# Patient Record
Sex: MALE | Race: WHITE | Employment: FULL TIME | URBAN - METROPOLITAN AREA
[De-identification: names, ages, dates, MRNs, and addresses within clinical notes are randomized per-mention and may not be internally consistent; named-entity substitution may affect disease eponyms.]

---

## 2017-11-16 PROBLEM — M54.42 CHRONIC LEFT-SIDED LOW BACK PAIN WITH LEFT-SIDED SCIATICA: Status: ACTIVE | Noted: 2017-11-16

## 2017-11-16 PROBLEM — G89.29 CHRONIC LEFT-SIDED LOW BACK PAIN WITH LEFT-SIDED SCIATICA: Status: ACTIVE | Noted: 2017-11-16

## 2020-06-17 ENCOUNTER — HOSPITAL ENCOUNTER (EMERGENCY)
Facility: HOSPITAL | Age: 25
Discharge: HOME | End: 2020-06-17
Attending: EMERGENCY MEDICINE

## 2020-06-17 ENCOUNTER — APPOINTMENT (OUTPATIENT)
Dept: GENERAL RADIOLOGY | Facility: HOSPITAL | Age: 25
End: 2020-06-17
Attending: EMERGENCY MEDICINE

## 2020-06-17 VITALS
TEMPERATURE: 99 F | WEIGHT: 195.31 LBS | RESPIRATION RATE: 20 BRPM | OXYGEN SATURATION: 97 % | HEART RATE: 62 BPM | DIASTOLIC BLOOD PRESSURE: 79 MMHG | BODY MASS INDEX: 25 KG/M2 | SYSTOLIC BLOOD PRESSURE: 123 MMHG

## 2020-06-17 DIAGNOSIS — R07.89 CHEST PAIN, ATYPICAL: Primary | ICD-10-CM

## 2020-06-17 DIAGNOSIS — M94.0 COSTOCHONDRITIS, ACUTE: ICD-10-CM

## 2020-06-17 PROCEDURE — 84484 ASSAY OF TROPONIN QUANT: CPT | Performed by: EMERGENCY MEDICINE

## 2020-06-17 PROCEDURE — 85025 COMPLETE CBC W/AUTO DIFF WBC: CPT | Performed by: EMERGENCY MEDICINE

## 2020-06-17 PROCEDURE — 93005 ELECTROCARDIOGRAM TRACING: CPT

## 2020-06-17 PROCEDURE — 99284 EMERGENCY DEPT VISIT MOD MDM: CPT

## 2020-06-17 PROCEDURE — 80048 BASIC METABOLIC PNL TOTAL CA: CPT | Performed by: EMERGENCY MEDICINE

## 2020-06-17 PROCEDURE — 71045 X-RAY EXAM CHEST 1 VIEW: CPT | Performed by: EMERGENCY MEDICINE

## 2020-06-17 PROCEDURE — 36415 COLL VENOUS BLD VENIPUNCTURE: CPT

## 2020-06-17 PROCEDURE — 93010 ELECTROCARDIOGRAM REPORT: CPT | Performed by: EMERGENCY MEDICINE

## 2020-06-17 NOTE — ED NOTES
Pt is on direct observation with this ER Tech. Pt is sitting on his cart. He is cooperative and calm.

## 2020-06-17 NOTE — ED INITIAL ASSESSMENT (HPI)
Patient here with c/o mid sternal chest pain that started last night. States it is worse with movement and activity. States he has had similar pain in the past but it was found to be \"inconclusive. \" Denies cough/fevers--states he has occasional smokers c

## 2020-06-17 NOTE — ED PROVIDER NOTES
Patient Seen in: [facility] Emergency Department    History   Patient presents with:  Chest Pain Angina  Eval-P    Stated Complaint: midsternal chest pain last night    HPI    Patient is here with complaint of pain to the left anterior portion of breath  Gastrointestinal: no abdominal pain, no nausea, no vomiting        Positive for stated complaint: midsternal chest pain last night  Other systems are as noted in HPI. Constitutional and vital signs reviewed.       All other systems reviewed and neg has no plan to hurt himself and does not plan to hurt himself. He was given resources.     I discussed the patient's work-up and my feeling of costochondritis I discussed mechanics of movement NSAIDs as needed expected course of healing reasons to return a

## 2020-06-18 NOTE — BH LEVEL OF CARE ASSESSMENT
Level of Care Assessment Note    General Questions  Why are you here?: \"I have pain in my back and my hips. \"  Precipitating Events: Pt lost his brother 2 weeks ago. Hx of chronic pain. COVID stressors.   History of Present Illness: Pt reports hx of depres someone harmed or killed further back than 30 days?: No  Current or Past Harm Toward Animals: No  History or Allegations of Inappropriate Physical Contact: No  Have you ever damaged/destroyed property or thought about it?: No    Access to Means  Has access Current/Previous MH/CD Providers  Hospitalizations, Placements, Therapy, Detox:  Yes                          Current/Previous MH/CD Treatment  Recovery Support Groups: Denies Past History Anger  Appropriateness of Affect: Congruent to mood; Inappropriate to situation  Range of Affect: Normal  Stability of Affect: Stable  Attitude toward staff: Co-operative; Angry  Speech  Rate of Speech: Appropriate  Flow of Speech: Appropriate  Intensity of Unit Assigned: age/sxs/location  Partial Criteria: Potential for non-compliance  Refused Treatment: Yes  Can an [facility] Staff Call You in 24-72 Hours?: No  Refused Treatment Reason: Financial [place]  Education Provided: Call 911 in an Emergency;[facility] Crisis Line

## 2021-01-07 ENCOUNTER — APPOINTMENT (OUTPATIENT)
Dept: GENERAL RADIOLOGY | Facility: HOSPITAL | Age: 26
End: 2021-01-07
Attending: EMERGENCY MEDICINE

## 2021-01-07 ENCOUNTER — HOSPITAL ENCOUNTER (EMERGENCY)
Facility: HOSPITAL | Age: 26
Discharge: HOME | End: 2021-01-07
Attending: EMERGENCY MEDICINE

## 2021-01-07 VITALS
BODY MASS INDEX: 28 KG/M2 | TEMPERATURE: 98 F | HEART RATE: 65 BPM | WEIGHT: 220 LBS | SYSTOLIC BLOOD PRESSURE: 114 MMHG | RESPIRATION RATE: 14 BRPM | DIASTOLIC BLOOD PRESSURE: 73 MMHG | OXYGEN SATURATION: 96 %

## 2021-01-07 DIAGNOSIS — R07.89 ANTERIOR CHEST WALL PAIN: Primary | ICD-10-CM

## 2021-01-07 LAB
ANION GAP SERPL CALC-SCNC: 3 MMOL/L (ref 0–18)
BASOPHILS # BLD AUTO: 0.1 X10(3) UL (ref 0–0.2)
BASOPHILS NFR BLD AUTO: 1.1 %
BUN BLD-MCNC: 14 MG/DL (ref 7–18)
BUN/CREAT SERPL: 18.9 (ref 10–20)
CALCIUM BLD-MCNC: 9 MG/DL (ref 8.5–10.1)
CHLORIDE SERPL-SCNC: 107 MMOL/L (ref 98–112)
CK SERPL-CCNC: 166 U/L
CO2 SERPL-SCNC: 30 MMOL/L (ref 21–32)
CREAT BLD-MCNC: 0.74 MG/DL
D DIMER PPP FEU-MCNC: <0.27 UG/ML FEU (ref ?–0.5)
DEPRECATED RDW RBC AUTO: 42.1 FL (ref 35.1–46.3)
EOSINOPHIL # BLD AUTO: 0.35 X10(3) UL (ref 0–0.7)
EOSINOPHIL NFR BLD AUTO: 3.9 %
ERYTHROCYTE [DISTWIDTH] IN BLOOD BY AUTOMATED COUNT: 12.5 % (ref 11–15)
GLUCOSE BLD-MCNC: 87 MG/DL (ref 70–99)
HCT VFR BLD AUTO: 40.6 %
HGB BLD-MCNC: 13.9 G/DL
IMM GRANULOCYTES # BLD AUTO: 0.02 X10(3) UL (ref 0–1)
IMM GRANULOCYTES NFR BLD: 0.2 %
LYMPHOCYTES # BLD AUTO: 3.58 X10(3) UL (ref 1–4)
LYMPHOCYTES NFR BLD AUTO: 39.4 %
MCH RBC QN AUTO: 31.3 PG (ref 26–34)
MCHC RBC AUTO-ENTMCNC: 34.2 G/DL (ref 31–37)
MCV RBC AUTO: 91.4 FL
MONOCYTES # BLD AUTO: 0.65 X10(3) UL (ref 0.1–1)
MONOCYTES NFR BLD AUTO: 7.2 %
NEUTROPHILS # BLD AUTO: 4.39 X10 (3) UL (ref 1.5–7.7)
NEUTROPHILS # BLD AUTO: 4.39 X10(3) UL (ref 1.5–7.7)
NEUTROPHILS NFR BLD AUTO: 48.2 %
OSMOLALITY SERPL CALC.SUM OF ELEC: 290 MOSM/KG (ref 275–295)
PLATELET # BLD AUTO: 285 10(3)UL (ref 150–450)
POTASSIUM SERPL-SCNC: 3.8 MMOL/L (ref 3.5–5.1)
RBC # BLD AUTO: 4.44 X10(6)UL
SODIUM SERPL-SCNC: 140 MMOL/L (ref 136–145)
TROPONIN I SERPL-MCNC: <0.045 NG/ML (ref ?–0.04)
WBC # BLD AUTO: 9.1 X10(3) UL (ref 4–11)

## 2021-01-07 PROCEDURE — 99285 EMERGENCY DEPT VISIT HI MDM: CPT

## 2021-01-07 PROCEDURE — 82550 ASSAY OF CK (CPK): CPT | Performed by: EMERGENCY MEDICINE

## 2021-01-07 PROCEDURE — 85379 FIBRIN DEGRADATION QUANT: CPT | Performed by: EMERGENCY MEDICINE

## 2021-01-07 PROCEDURE — 80048 BASIC METABOLIC PNL TOTAL CA: CPT | Performed by: EMERGENCY MEDICINE

## 2021-01-07 PROCEDURE — 71045 X-RAY EXAM CHEST 1 VIEW: CPT | Performed by: EMERGENCY MEDICINE

## 2021-01-07 PROCEDURE — 93010 ELECTROCARDIOGRAM REPORT: CPT | Performed by: EMERGENCY MEDICINE

## 2021-01-07 PROCEDURE — 84484 ASSAY OF TROPONIN QUANT: CPT | Performed by: EMERGENCY MEDICINE

## 2021-01-07 PROCEDURE — 96375 TX/PRO/DX INJ NEW DRUG ADDON: CPT

## 2021-01-07 PROCEDURE — 96374 THER/PROPH/DIAG INJ IV PUSH: CPT

## 2021-01-07 PROCEDURE — 85025 COMPLETE CBC W/AUTO DIFF WBC: CPT | Performed by: EMERGENCY MEDICINE

## 2021-01-07 PROCEDURE — 93005 ELECTROCARDIOGRAM TRACING: CPT

## 2021-01-07 RX ORDER — KETOROLAC TROMETHAMINE 15 MG/ML
15 INJECTION, SOLUTION INTRAMUSCULAR; INTRAVENOUS ONCE
Status: COMPLETED | OUTPATIENT
Start: 2021-01-07 | End: 2021-01-07

## 2021-01-07 RX ORDER — KETOROLAC TROMETHAMINE 10 MG/1
10 TABLET, FILM COATED ORAL EVERY 6 HOURS PRN
Qty: 20 TABLET | Refills: 0 | Status: SHIPPED | OUTPATIENT
Start: 2021-01-07 | End: 2021-01-12

## 2021-01-07 RX ORDER — LIDOCAINE 50 MG/G
1 PATCH TOPICAL EVERY 24 HOURS
Qty: 7 PATCH | Refills: 0 | Status: SHIPPED | OUTPATIENT
Start: 2021-01-07 | End: 2021-01-14

## 2021-01-07 RX ORDER — ORPHENADRINE CITRATE 30 MG/ML
30 INJECTION INTRAMUSCULAR; INTRAVENOUS ONCE
Status: COMPLETED | OUTPATIENT
Start: 2021-01-07 | End: 2021-01-07

## 2021-01-07 RX ORDER — ORPHENADRINE CITRATE 100 MG/1
100 TABLET, EXTENDED RELEASE ORAL 2 TIMES DAILY PRN
Qty: 20 TABLET | Refills: 0 | Status: SHIPPED | OUTPATIENT
Start: 2021-01-07 | End: 2021-01-17

## 2021-01-08 NOTE — ED PROVIDER NOTES
Patient Seen in: [facility] AND [facility] Emergency Department    History   Patient presents with:  Chest Pain Angina    Stated Complaint: cp      HPI    69-year-old male without past medical history presenting with uncle for evaluation of 2 months of intermitten reviewed and negative except as noted above. PSFH elements reviewed from today and agreed except as otherwise stated in HPI.     Physical Exam     ED Triage Vitals [01/07/21 1924]   /75   Pulse 63   Resp 18   Temp 98 °F (36.7 °C)   Temp src Oral INDICATIONS: Intermittent retrosternal chest pain and shortness of breath x1 week. Current smoker. TECHNIQUE:   Single view. FINDINGS:  CARDIAC/VASC: Normal.  No cardiac silhouette abnormality or cardiomegaly. Unremarkable pulmonary vasculature.   MEDIA [address] [place] [address] [address] [phone]    Call  For primary care followup, re-evaluation and to establish care.       Medications Prescribed:  Discharge Medication List as of 1/7/2021 10:39 PM    START taking these medications    Ketorolac Tromethamine 10 MG

## 2021-01-08 NOTE — ED INITIAL ASSESSMENT (HPI)
Mid chest pain x months, worse with movements. Notes pain worse past 3 days,   Notes shortness of breath.   Denies cough/fevers
breast and formula feeding

## 2021-09-29 ENCOUNTER — HOSPITAL ENCOUNTER (OUTPATIENT)
Age: 26
Discharge: HOME | End: 2021-09-29

## 2021-09-29 VITALS
OXYGEN SATURATION: 98 % | SYSTOLIC BLOOD PRESSURE: 130 MMHG | RESPIRATION RATE: 16 BRPM | HEIGHT: 74 IN | HEART RATE: 85 BPM | DIASTOLIC BLOOD PRESSURE: 79 MMHG | TEMPERATURE: 98 F | BODY MASS INDEX: 29.52 KG/M2 | WEIGHT: 230 LBS

## 2021-09-29 DIAGNOSIS — M54.16 LUMBAR RADICULOPATHY: Primary | ICD-10-CM

## 2021-09-29 PROCEDURE — 99203 OFFICE O/P NEW LOW 30 MIN: CPT | Performed by: NURSE PRACTITIONER

## 2021-09-29 RX ORDER — DIAZEPAM 5 MG/1
5 TABLET ORAL 3 TIMES DAILY PRN
Qty: 9 TABLET | Refills: 0 | Status: SHIPPED | OUTPATIENT
Start: 2021-09-29 | End: 2021-10-02

## 2021-09-29 RX ORDER — LIDOCAINE 50 MG/G
1 PATCH TOPICAL EVERY 24 HOURS
Qty: 3 PATCH | Refills: 0 | Status: SHIPPED | OUTPATIENT
Start: 2021-09-29 | End: 2021-10-02

## 2021-09-29 NOTE — ED PROVIDER NOTES
Patient Seen in: [facility]      History   Patient presents with:  Back Pain    Stated Complaint: low back pain    Subjective:   HPI    32 yo male arrives to the ic with co llb pain with radicular pain, hx of similar in the past, denies loss Extraocular movements intact. Pupils: Pupils are equal, round, and reactive to light. Pulmonary:      Effort: No tachypnea or respiratory distress. Musculoskeletal:         General: Normal range of motion.       Cervical back: Normal range of motio radiculopathy, less likely epidural absecss, cauda equina syndrome, fx, osteomyelitis, neoplasm asi have considered these diff clincal imrpessions                             Disposition and Plan     Clinical Impression:  Lumbar radiculopathy  (primary enc

## 2021-10-03 ENCOUNTER — HOSPITAL ENCOUNTER (OUTPATIENT)
Age: 26
Discharge: HOME | End: 2021-10-03

## 2021-10-03 ENCOUNTER — APPOINTMENT (OUTPATIENT)
Dept: GENERAL RADIOLOGY | Age: 26
End: 2021-10-03
Attending: NURSE PRACTITIONER

## 2021-10-03 VITALS
RESPIRATION RATE: 16 BRPM | TEMPERATURE: 99 F | OXYGEN SATURATION: 97 % | HEART RATE: 68 BPM | SYSTOLIC BLOOD PRESSURE: 116 MMHG | DIASTOLIC BLOOD PRESSURE: 63 MMHG

## 2021-10-03 DIAGNOSIS — M54.42 CHRONIC LEFT-SIDED LOW BACK PAIN WITH LEFT-SIDED SCIATICA: Primary | ICD-10-CM

## 2021-10-03 DIAGNOSIS — G89.29 CHRONIC LEFT-SIDED LOW BACK PAIN WITH LEFT-SIDED SCIATICA: Primary | ICD-10-CM

## 2021-10-03 PROCEDURE — 99213 OFFICE O/P EST LOW 20 MIN: CPT | Performed by: NURSE PRACTITIONER

## 2021-10-03 PROCEDURE — 72100 X-RAY EXAM L-S SPINE 2/3 VWS: CPT | Performed by: NURSE PRACTITIONER

## 2021-10-03 NOTE — ED PROVIDER NOTES
Patient Seen in: [facility]      History   Patient presents with:  Back Pain    Stated Complaint: back pain    Subjective:   HPI    This is a 21-year-old male presenting with chronic back pain.   Patient states, he has had back pain for 3 year heart sounds. Pulmonary:      Effort: Pulmonary effort is normal. No respiratory distress. Breath sounds: Normal breath sounds. No wheezing. Abdominal:      Palpations: Abdomen is soft. Tenderness:  There is no right CVA tenderness or left CVA instructions placed in discharge for work. Patient acknowledged understanding discharge instructions.                        Disposition and Plan     Clinical Impression:  Chronic left-sided low back pain with left-sided sciatica  (primary encounter diagno

## 2021-10-07 ENCOUNTER — HOSPITAL ENCOUNTER (EMERGENCY)
Facility: HOSPITAL | Age: 26
Discharge: HOME | End: 2021-10-07
Attending: EMERGENCY MEDICINE

## 2021-10-07 VITALS
TEMPERATURE: 98 F | WEIGHT: 220 LBS | BODY MASS INDEX: 27.35 KG/M2 | SYSTOLIC BLOOD PRESSURE: 133 MMHG | OXYGEN SATURATION: 95 % | DIASTOLIC BLOOD PRESSURE: 77 MMHG | RESPIRATION RATE: 15 BRPM | HEART RATE: 82 BPM | HEIGHT: 75 IN

## 2021-10-07 DIAGNOSIS — J02.9 PHARYNGITIS, UNSPECIFIED ETIOLOGY: Primary | ICD-10-CM

## 2021-10-07 PROCEDURE — 87880 STREP A ASSAY W/OPTIC: CPT

## 2021-10-07 PROCEDURE — 99283 EMERGENCY DEPT VISIT LOW MDM: CPT

## 2021-10-07 RX ORDER — KETOROLAC TROMETHAMINE 10 MG/1
10 TABLET, FILM COATED ORAL EVERY 6 HOURS PRN
Qty: 15 TABLET | Refills: 0 | Status: SHIPPED | OUTPATIENT
Start: 2021-10-07 | End: 2021-10-14

## 2021-10-07 RX ORDER — AZITHROMYCIN 250 MG/1
TABLET, FILM COATED ORAL
Qty: 6 TABLET | Refills: 0 | Status: SHIPPED | OUTPATIENT
Start: 2021-10-07 | End: 2021-10-12

## 2021-10-07 NOTE — ED QUICK NOTES
Pt provided and explained d/c instructions, at-home care, follow-up, and rx. Pt in nad at this time. No iv access. Vss. Ojeda. Ambulatory. A&ox3. Belongings with pt. All questions and concerns addressed.

## 2021-10-07 NOTE — ED PROVIDER NOTES
Patient Seen in: [facility] AND [facility] Emergency Department    History   Patient presents with:  Sore Throat    Stated Complaint: back pain and sore throat    HPI    Patient here complaining of sore throat for couple days.   Notes pain is described as sharp a Device None (Room air)       Current:/77   Pulse 82   Temp 98.1 °F (36.7 °C) (Oral)   Resp 15   Ht 190.5 cm (6' 3\")   Wt 99.8 kg   SpO2 95%   BMI 27.50 kg/m²       GENERAL: awake alert non toxic  HEAD: normocephalic, atraumatic  EYES: sclera non ict

## 2021-10-08 ENCOUNTER — HOSPITAL ENCOUNTER (OUTPATIENT)
Age: 26
Discharge: HOME | End: 2021-10-08

## 2021-10-08 VITALS
HEART RATE: 66 BPM | TEMPERATURE: 99 F | HEIGHT: 74 IN | BODY MASS INDEX: 28.23 KG/M2 | WEIGHT: 220 LBS | RESPIRATION RATE: 16 BRPM | OXYGEN SATURATION: 95 % | DIASTOLIC BLOOD PRESSURE: 71 MMHG | SYSTOLIC BLOOD PRESSURE: 124 MMHG

## 2021-10-08 DIAGNOSIS — Z87.39 HISTORY OF BACK PAIN: ICD-10-CM

## 2021-10-08 DIAGNOSIS — Z76.89 RETURN TO WORK EVALUATION: Primary | ICD-10-CM

## 2021-10-08 PROCEDURE — 99213 OFFICE O/P EST LOW 20 MIN: CPT | Performed by: NURSE PRACTITIONER

## 2021-10-08 NOTE — ED PROVIDER NOTES
Patient presents with:  Medical Clearance      HPI:     Eddie Lyles is a 31year old male who presents for a note to return to work. The patient was seen here multiple times in the past couple weeks for a back strain.  He has had left sided lower ba     Feeling of Stress : Not on file  Social Connections:       Frequency of Communication with Friends and Family: Not on file      Frequency of Social Gatherings with Friends and Family: Not on file      Attends Taoism Services: Not on file      Acti or any previous visit (from the past 10 hour(s)). MDM:  I will write the patient a note to return to work. He is aware he should follow-up with a physical medicine  doctor or regular doctor referred to him. Diagnosis:    ICD-10-CM    1.  Return to wo

## 2021-10-13 ENCOUNTER — HOSPITAL ENCOUNTER (EMERGENCY)
Facility: HOSPITAL | Age: 26
Discharge: HOME | End: 2021-10-13
Attending: EMERGENCY MEDICINE

## 2021-10-13 VITALS
WEIGHT: 220 LBS | SYSTOLIC BLOOD PRESSURE: 122 MMHG | TEMPERATURE: 99 F | HEART RATE: 57 BPM | BODY MASS INDEX: 28.23 KG/M2 | HEIGHT: 74 IN | OXYGEN SATURATION: 96 % | DIASTOLIC BLOOD PRESSURE: 76 MMHG | RESPIRATION RATE: 16 BRPM

## 2021-10-13 DIAGNOSIS — G89.29 ACUTE EXACERBATION OF CHRONIC LOW BACK PAIN: ICD-10-CM

## 2021-10-13 DIAGNOSIS — M54.42 LEFT-SIDED LOW BACK PAIN WITH LEFT-SIDED SCIATICA, UNSPECIFIED CHRONICITY: Primary | ICD-10-CM

## 2021-10-13 DIAGNOSIS — M54.50 ACUTE EXACERBATION OF CHRONIC LOW BACK PAIN: ICD-10-CM

## 2021-10-13 PROCEDURE — 96372 THER/PROPH/DIAG INJ SC/IM: CPT

## 2021-10-13 PROCEDURE — 81003 URINALYSIS AUTO W/O SCOPE: CPT | Performed by: EMERGENCY MEDICINE

## 2021-10-13 PROCEDURE — 99284 EMERGENCY DEPT VISIT MOD MDM: CPT

## 2021-10-13 RX ORDER — DEXAMETHASONE SODIUM PHOSPHATE 10 MG/ML
10 INJECTION, SOLUTION INTRAMUSCULAR; INTRAVENOUS ONCE
Status: COMPLETED | OUTPATIENT
Start: 2021-10-13 | End: 2021-10-13

## 2021-10-13 RX ORDER — KETOROLAC TROMETHAMINE 10 MG/1
10 TABLET, FILM COATED ORAL EVERY 6 HOURS PRN
Qty: 20 TABLET | Refills: 0 | Status: SHIPPED | OUTPATIENT
Start: 2021-10-13 | End: 2021-10-18

## 2021-10-13 RX ORDER — ORPHENADRINE CITRATE 100 MG/1
100 TABLET, EXTENDED RELEASE ORAL 2 TIMES DAILY
Qty: 20 TABLET | Refills: 0 | Status: SHIPPED | OUTPATIENT
Start: 2021-10-13 | End: 2021-10-23

## 2021-10-13 RX ORDER — ORPHENADRINE CITRATE 30 MG/ML
60 INJECTION INTRAMUSCULAR; INTRAVENOUS ONCE
Status: COMPLETED | OUTPATIENT
Start: 2021-10-13 | End: 2021-10-13

## 2021-10-13 RX ORDER — PREDNISONE 20 MG/1
40 TABLET ORAL DAILY
Qty: 8 TABLET | Refills: 0 | Status: SHIPPED | OUTPATIENT
Start: 2021-10-13 | End: 2021-10-17

## 2021-10-13 RX ORDER — KETOROLAC TROMETHAMINE 30 MG/ML
30 INJECTION, SOLUTION INTRAMUSCULAR; INTRAVENOUS ONCE
Status: COMPLETED | OUTPATIENT
Start: 2021-10-13 | End: 2021-10-13

## 2021-10-13 RX ORDER — LIDOCAINE 50 MG/G
1 PATCH TOPICAL EVERY 24 HOURS
Qty: 7 PATCH | Refills: 0 | Status: SHIPPED | OUTPATIENT
Start: 2021-10-13 | End: 2021-10-20

## 2021-10-13 NOTE — ED INITIAL ASSESSMENT (HPI)
Pt reports chronic back pain, reports having left hip pain radiates down back of left leg for past couple days

## 2021-10-13 NOTE — ED PROVIDER NOTES
Patient Seen in: [facility] AND [facility] Emergency Department    History   Patient presents with:  Back Pain    Stated Complaint: lower back and left leg pain    HPI    69-year-old male without past medical history aside from chronic back pain presenting for matt vomiting and abdominal pain. Genitourinary: Negative for dysuria and hematuria. Musculoskeletal: (+) lumbar pain. Skin: Negative for rash. No itching. Neurological: Negative for syncope and headaches.      Positive for stated complaint: lower back a SACROILIAC JOINTS: Normal.  OTHER: Negative. CONCLUSION:  1. Mild S-shaped scoliosis. 2. No acute fracture or malalignment.     Dictated by (CST): Karl Smith MD on 10/03/2021 at 3:30 PM     Finalized by (CST): Karl Smith MD on 10/ Prescribed:  Discharge Medication List as of 10/13/2021  9:15 AM    START taking these medications    !! Ketorolac Tromethamine 10 MG Oral Tab  Take 1 tablet (10 mg total) by mouth every 6 (six) hours as needed for Pain., Normal, Disp-20 tablet, R-0    pre

## 2021-10-13 NOTE — ED QUICK NOTES
PT safe to DC home per MD. Tank Peer to dress self. DC teaching done, pt verbalizes understanding. Ambulatory with steady gait to exit.

## 2021-11-03 ENCOUNTER — TELEPHONE (OUTPATIENT)
Dept: PHYSICAL MEDICINE AND REHAB | Facility: CLINIC | Age: 26
End: 2021-11-03

## 2021-11-03 NOTE — TELEPHONE ENCOUNTER
Pt came in 10 minutes after appointment- explained that it was self- pay $285.00. Pt stated he did not have the money. Was very upset and stormed out. Pt called to see if Dr had any cancellations for today.  940a called  149pm - female called - advise the

## 2022-03-23 ENCOUNTER — HOSPITAL ENCOUNTER (EMERGENCY)
Facility: HOSPITAL | Age: 27
Discharge: HOME | End: 2022-03-24
Attending: EMERGENCY MEDICINE
Payer: COMMERCIAL

## 2022-03-23 DIAGNOSIS — R10.9 FLANK PAIN: Primary | ICD-10-CM

## 2022-03-23 DIAGNOSIS — K76.0 FATTY LIVER: ICD-10-CM

## 2022-03-23 PROCEDURE — 99284 EMERGENCY DEPT VISIT MOD MDM: CPT

## 2022-03-23 PROCEDURE — 96372 THER/PROPH/DIAG INJ SC/IM: CPT

## 2022-03-23 RX ORDER — KETOROLAC TROMETHAMINE 15 MG/ML
15 INJECTION, SOLUTION INTRAMUSCULAR; INTRAVENOUS ONCE
Status: COMPLETED | OUTPATIENT
Start: 2022-03-23 | End: 2022-03-23

## 2022-03-24 ENCOUNTER — APPOINTMENT (OUTPATIENT)
Dept: CT IMAGING | Facility: HOSPITAL | Age: 27
End: 2022-03-24
Attending: EMERGENCY MEDICINE
Payer: COMMERCIAL

## 2022-03-24 VITALS
SYSTOLIC BLOOD PRESSURE: 112 MMHG | HEART RATE: 65 BPM | WEIGHT: 220 LBS | RESPIRATION RATE: 16 BRPM | HEIGHT: 75 IN | DIASTOLIC BLOOD PRESSURE: 59 MMHG | OXYGEN SATURATION: 94 % | BODY MASS INDEX: 27.35 KG/M2 | TEMPERATURE: 97 F

## 2022-03-24 LAB
BILIRUB UR QL: NEGATIVE
COLOR UR: YELLOW
GLUCOSE UR-MCNC: NEGATIVE MG/DL
HGB UR QL STRIP.AUTO: NEGATIVE
LEUKOCYTE ESTERASE UR QL STRIP.AUTO: NEGATIVE
NITRITE UR QL STRIP.AUTO: NEGATIVE
PH UR: 6 [PH] (ref 5–8)
PROT UR-MCNC: NEGATIVE MG/DL
SP GR UR STRIP: 1.02 (ref 1–1.03)
UROBILINOGEN UR STRIP-ACNC: 2

## 2022-03-24 PROCEDURE — 81003 URINALYSIS AUTO W/O SCOPE: CPT | Performed by: EMERGENCY MEDICINE

## 2022-03-24 PROCEDURE — 74176 CT ABD & PELVIS W/O CONTRAST: CPT | Performed by: EMERGENCY MEDICINE

## 2022-03-24 RX ORDER — CYCLOBENZAPRINE HCL 10 MG
10 TABLET ORAL 3 TIMES DAILY PRN
Qty: 14 TABLET | Refills: 0 | Status: SHIPPED | OUTPATIENT
Start: 2022-03-24

## 2022-03-24 NOTE — ED INITIAL ASSESSMENT (HPI)
Patient to Er from home with c/o right flank pain starting tonight. Denies blood in urine or dysuria.

## 2022-10-27 ENCOUNTER — APPOINTMENT (OUTPATIENT)
Dept: GENERAL RADIOLOGY | Facility: HOSPITAL | Age: 27
End: 2022-10-27
Attending: EMERGENCY MEDICINE
Payer: COMMERCIAL

## 2022-10-27 ENCOUNTER — HOSPITAL ENCOUNTER (EMERGENCY)
Facility: HOSPITAL | Age: 27
Discharge: HOME | End: 2022-10-27
Attending: EMERGENCY MEDICINE
Payer: COMMERCIAL

## 2022-10-27 VITALS
RESPIRATION RATE: 16 BRPM | DIASTOLIC BLOOD PRESSURE: 79 MMHG | WEIGHT: 225 LBS | TEMPERATURE: 98 F | HEIGHT: 74 IN | OXYGEN SATURATION: 99 % | HEART RATE: 80 BPM | BODY MASS INDEX: 28.88 KG/M2 | SYSTOLIC BLOOD PRESSURE: 120 MMHG

## 2022-10-27 DIAGNOSIS — M54.6 BACK PAIN OF THORACOLUMBAR REGION: Primary | ICD-10-CM

## 2022-10-27 DIAGNOSIS — M54.50 BACK PAIN OF THORACOLUMBAR REGION: Primary | ICD-10-CM

## 2022-10-27 LAB
BILIRUB UR QL: NEGATIVE
COLOR UR: YELLOW
GLUCOSE UR-MCNC: NEGATIVE MG/DL
HGB UR QL STRIP.AUTO: NEGATIVE
KETONES UR-MCNC: NEGATIVE MG/DL
LEUKOCYTE ESTERASE UR QL STRIP.AUTO: NEGATIVE
NITRITE UR QL STRIP.AUTO: NEGATIVE
PH UR: 6 [PH] (ref 5–8)
PROT UR-MCNC: NEGATIVE MG/DL
SP GR UR STRIP: 1.02 (ref 1–1.03)
UROBILINOGEN UR STRIP-ACNC: <2
VIT C UR-MCNC: NEGATIVE MG/DL

## 2022-10-27 PROCEDURE — 99284 EMERGENCY DEPT VISIT MOD MDM: CPT

## 2022-10-27 PROCEDURE — 72100 X-RAY EXAM L-S SPINE 2/3 VWS: CPT | Performed by: EMERGENCY MEDICINE

## 2022-10-27 PROCEDURE — 99283 EMERGENCY DEPT VISIT LOW MDM: CPT

## 2022-10-27 PROCEDURE — 72072 X-RAY EXAM THORAC SPINE 3VWS: CPT | Performed by: EMERGENCY MEDICINE

## 2022-10-27 PROCEDURE — 81001 URINALYSIS AUTO W/SCOPE: CPT | Performed by: EMERGENCY MEDICINE

## 2022-10-27 RX ORDER — HYDROCODONE BITARTRATE AND ACETAMINOPHEN 5; 325 MG/1; MG/1
1 TABLET ORAL EVERY 6 HOURS PRN
Qty: 10 TABLET | Refills: 0 | Status: SHIPPED | OUTPATIENT
Start: 2022-10-27

## 2022-10-27 RX ORDER — CYCLOBENZAPRINE HCL 10 MG
10 TABLET ORAL 3 TIMES DAILY PRN
Qty: 20 TABLET | Refills: 0 | Status: SHIPPED | OUTPATIENT
Start: 2022-10-27 | End: 2022-11-03

## 2022-10-27 RX ORDER — METHYLPREDNISOLONE 4 MG/1
TABLET ORAL
Qty: 1 EACH | Refills: 0 | Status: SHIPPED | OUTPATIENT
Start: 2022-10-27

## 2022-10-27 NOTE — ED INITIAL ASSESSMENT (HPI)
Pt from home with atraumatic acute on chronic lower back pain with radiation to upper back and down right leg x 3 days with numbness to bilateral hands and arms.

## 2023-04-06 ENCOUNTER — APPOINTMENT (OUTPATIENT)
Dept: CT IMAGING | Facility: HOSPITAL | Age: 28
End: 2023-04-06
Attending: NURSE PRACTITIONER
Payer: OTHER MISCELLANEOUS

## 2023-04-06 ENCOUNTER — HOSPITAL ENCOUNTER (EMERGENCY)
Facility: HOSPITAL | Age: 28
Discharge: HOME | End: 2023-04-06
Payer: OTHER MISCELLANEOUS

## 2023-04-06 VITALS
SYSTOLIC BLOOD PRESSURE: 168 MMHG | WEIGHT: 220 LBS | BODY MASS INDEX: 27.35 KG/M2 | HEIGHT: 75 IN | DIASTOLIC BLOOD PRESSURE: 75 MMHG | HEART RATE: 80 BPM | TEMPERATURE: 97 F | RESPIRATION RATE: 16 BRPM | OXYGEN SATURATION: 100 %

## 2023-04-06 DIAGNOSIS — S10.93XA CONTUSION OF NECK, INITIAL ENCOUNTER: Primary | ICD-10-CM

## 2023-04-06 PROCEDURE — 99283 EMERGENCY DEPT VISIT LOW MDM: CPT

## 2023-04-06 PROCEDURE — 99284 EMERGENCY DEPT VISIT MOD MDM: CPT

## 2023-04-06 PROCEDURE — 72125 CT NECK SPINE W/O DYE: CPT | Performed by: NURSE PRACTITIONER

## 2024-06-12 ENCOUNTER — HOSPITAL ENCOUNTER (EMERGENCY)
Facility: HOSPITAL | Age: 29
Discharge: HOME | End: 2024-06-12
Attending: EMERGENCY MEDICINE

## 2024-06-12 VITALS
OXYGEN SATURATION: 96 % | HEART RATE: 81 BPM | SYSTOLIC BLOOD PRESSURE: 118 MMHG | DIASTOLIC BLOOD PRESSURE: 71 MMHG | RESPIRATION RATE: 18 BRPM | WEIGHT: 230 LBS | TEMPERATURE: 97 F | BODY MASS INDEX: 29.52 KG/M2 | HEIGHT: 74 IN

## 2024-06-12 DIAGNOSIS — M54.50 ACUTE RIGHT-SIDED LOW BACK PAIN WITHOUT SCIATICA: Primary | ICD-10-CM

## 2024-06-12 PROCEDURE — 96372 THER/PROPH/DIAG INJ SC/IM: CPT

## 2024-06-12 PROCEDURE — 99283 EMERGENCY DEPT VISIT LOW MDM: CPT

## 2024-06-12 PROCEDURE — 99284 EMERGENCY DEPT VISIT MOD MDM: CPT

## 2024-06-12 RX ORDER — ACETAMINOPHEN 500 MG
1000 TABLET ORAL ONCE
Status: COMPLETED | OUTPATIENT
Start: 2024-06-12 | End: 2024-06-12

## 2024-06-12 RX ORDER — LIDOCAINE 50 MG/G
1 PATCH TOPICAL EVERY 24 HOURS
Qty: 14 PATCH | Refills: 0 | Status: SHIPPED | OUTPATIENT
Start: 2024-06-12 | End: 2024-06-26

## 2024-06-12 RX ORDER — ACETAMINOPHEN 500 MG
1000 TABLET ORAL EVERY 6 HOURS PRN
Qty: 56 TABLET | Refills: 0 | Status: SHIPPED | OUTPATIENT
Start: 2024-06-12 | End: 2024-06-26

## 2024-06-12 RX ORDER — IBUPROFEN 200 MG
400 TABLET ORAL EVERY 6 HOURS PRN
Qty: 56 TABLET | Refills: 0 | Status: SHIPPED | OUTPATIENT
Start: 2024-06-12 | End: 2024-06-26

## 2024-06-12 RX ORDER — CYCLOBENZAPRINE HCL 10 MG
10 TABLET ORAL ONCE
Status: COMPLETED | OUTPATIENT
Start: 2024-06-12 | End: 2024-06-12

## 2024-06-12 RX ORDER — CYCLOBENZAPRINE HCL 10 MG
10 TABLET ORAL 3 TIMES DAILY PRN
Qty: 20 TABLET | Refills: 0 | Status: SHIPPED | OUTPATIENT
Start: 2024-06-12 | End: 2024-06-19

## 2024-06-12 RX ORDER — KETOROLAC TROMETHAMINE 15 MG/ML
15 INJECTION, SOLUTION INTRAMUSCULAR; INTRAVENOUS ONCE
Status: COMPLETED | OUTPATIENT
Start: 2024-06-12 | End: 2024-06-12

## 2024-06-12 NOTE — ED PROVIDER NOTES
Patient Seen in: Health system Emergency Department      History     Chief Complaint   Patient presents with    Back Pain     Stated Complaint: Lower Back Pain    Subjective:   HPI        Objective:   Past Medical History:    Pneumonia              Past Surgical History:   Procedure Laterality Date    Tonsillectomy                  Social History     Socioeconomic History    Marital status: Single   Tobacco Use    Smoking status: Every Day     Current packs/day: 0.50     Average packs/day: 0.5 packs/day for 9.0 years (4.5 ttl pk-yrs)     Types: Cigarettes    Smokeless tobacco: Never   Substance and Sexual Activity    Alcohol use: Yes     Alcohol/week: 0.0 standard drinks of alcohol     Comment: occ    Drug use: Yes     Types: Cannabis              Review of Systems    Positive for stated complaint: Lower Back Pain  Other systems are as noted in HPI.  Constitutional and vital signs reviewed.      All other systems reviewed and negative except as noted above.    Physical Exam     ED Triage Vitals [06/12/24 0620]   /71   Pulse 81   Resp 18   Temp 97 °F (36.1 °C)   Temp src Temporal   SpO2 96 %   O2 Device None (Room air)       Current Vitals:   Vital Signs  BP: 118/71  Pulse: 81  Resp: 18  Temp: 97 °F (36.1 °C)  Temp src: Temporal    Oxygen Therapy  SpO2: 96 %  O2 Device: None (Room air)            Physical Exam          ED Course   Labs Reviewed - No data to display                   MDM      29-year-old male with a history of low back pain presents today with acute on chronic pain.  Patient states that for the last 5 days, has been having an acute exacerbation of his pain.  Pain located primarily in the right lower back and worse with almost any movement.  Denies any specific inciting injury or strain.  Denies new numbness/weakness, urinary retention, bowel incontinence, or other abnormal symptoms.  Has tried taking ibuprofen at home without relief of symptoms.  Has also seen a chiropractor with temporary  relief of symptoms.    On exam, vitals normal, well-appearing, some tenderness to palpation across the lower back without specific midline tenderness or deformity.  Strength and sensation grossly intact in the lower extremities.    Differential: Acute on chronic low back pain.  Considered but low likelihood spinal cord compression, abscess, or other emergent pathology and therefore no emergent imaging indicated.    Patient given Toradol, Tylenol, Flexeril, lidocaine patch    On reexamination, feeling much better and ambulating without difficulty.  Given prescriptions for ibuprofen, Tylenol, lidocaine patch, Flexeril, and care instructions as well as follow-up instructions and return precautions.                                   Medical Decision Making      Disposition and Plan     Clinical Impression:  1. Acute right-sided low back pain without sciatica         Disposition:  Discharge  6/12/2024  7:52 am    Follow-up:  Your doctor    Call            Medications Prescribed:  Current Discharge Medication List        START taking these medications    Details   acetaminophen 500 MG Oral Tab Take 2 tablets (1,000 mg total) by mouth every 6 (six) hours as needed for Pain.  Qty: 56 tablet, Refills: 0      ibuprofen 200 MG Oral Tab Take 2 tablets (400 mg total) by mouth every 6 (six) hours as needed for Pain.  Qty: 56 tablet, Refills: 0      lidocaine 5 % External Patch Place 1 patch onto the skin daily for 14 days.  Qty: 14 patch, Refills: 0      !! cyclobenzaprine 10 MG Oral Tab Take 1 tablet (10 mg total) by mouth 3 (three) times daily as needed for Muscle spasms.  Qty: 20 tablet, Refills: 0       !! - Potential duplicate medications found. Please discuss with provider.

## 2024-06-12 NOTE — DISCHARGE INSTRUCTIONS
For your pain you may take the following:  -Acetaminophen (tylenol) 1000mg (two extra strength tablets) every 6 hours  -Ibuprofen (motrin) 400mg (two regular strength tablets) every 6 hours  -You should alternate between the two every 3 hours for the next 2 days, then switch to as needed.   -Change the lidocaine patch once per day  -Take the cyclobenzaprine as prescribed if needed for severe pain not responding to the above regimen  -Take the medications with food to avoid an upset stomach.

## (undated) NOTE — LETTER
Date & Time: 10/3/2021, 3:38 PM  Patient: Maura Montero  Encounter Provider(s):    ARNEL Ibarra       To Whom It May Concern:    Twila Locke was seen and treated in our department on 10/3/2021.  He should not return to work until 10/06

## (undated) NOTE — LETTER
Date & Time: 6/12/2024, 7:55 AM  Patient: Ash Mcmullen  Encounter Provider(s):    Arun Monroe MD       To Whom It May Concern:    Ash Mcmullen was seen and treated in our department on 6/12/2024. He should not return to work until 6/14/2024 .    If you have any questions or concerns, please do not hesitate to call.        _____________________________  Physician/APC Signature

## (undated) NOTE — LETTER
Date & Time: 10/7/2021, 6:42 AM  Patient: Nadeem Murphy  Encounter Provider(s):    Francisco Iglesias MD       To Whom It May Concern:    Kassidy Espinoza was seen and treated in our department on 10/7/2021. He is excused from work today.     If you hav

## (undated) NOTE — LETTER
Date & Time: 10/8/2021, 2:07 PM  Patient: Hannah Yeung  Encounter Provider(s):    Aleta Hamman, APRN       To Whom It May Concern:    Israel Clark was seen and treated in our department on 10/8/2021.  He can return to work on Monday, October 1

## (undated) NOTE — LETTER
Date & Time: 9/29/2021, 12:56 PM  Patient: Eddie Lyles  Encounter Provider(s):    ARNEL Barraza       To Whom It May Concern:    Lamar Pina was seen and treated in our department on 9/29/2021.  He should not return to work until [employer]

## (undated) NOTE — LETTER
Date & Time: 6/17/2020, 5:04 PM  Patient: Charlie Pena  Encounter Provider(s):    Manuela Schultz MD       To Whom It May Concern:    Yandy Barksdale was seen and treated in our department on 6/17/2020.  He should not return to work until [employer]

## (undated) NOTE — LETTER
Date & Time: 10/13/2021, 8:54 AM  Patient: Florian Ibarra  Encounter Provider(s):    Bruno Rosales MD       To Whom It May Concern:    Rosie Mayer was seen and treated in our department on 10/13/2021.  He should not return to work until 10/18/2

## (undated) NOTE — LETTER
Date & Time: 10/27/2022, 12:27 PM  Patient: Yanet Siu  Encounter Provider(s):    Venus Marshall MD       To Whom It May Concern:    Charo Partida was seen and treated in our department on 10/27/2022. He should not return to work until 10/31/22.     If you have any questions or concerns, please do not hesitate to call.        _____________________________  Physician/APC Signature

## (undated) NOTE — LETTER
Date & Time: 4/6/2023, 8:02 PM  Patient: Mona Horta  Encounter Provider(s):    ARNEL Ceballos       To Whom It May Concern:    Delaney Herrera was seen and treated in our department on 4/6/2023. He should not return to work until 04/08/2023.     If you have any questions or concerns, please do not hesitate to call.        _____________________________  Physician/APC Signature

## (undated) NOTE — LETTER
Date & Time: 9/29/2021, 12:58 PM  Patient: Jose Pelayo  Encounter Provider(s):    ARNEL Meek       To Whom It May Concern:    Zachary Burciaga was seen and treated in our department on 9/29/2021.  He should not return to work until [employer]

## (undated) NOTE — LETTER
Date & Time: 1/8/2021, 2:48 PM  Patient: Melissa Osullivan  Encounter Provider(s):    Ned Post MD       To Whom It May Concern:    Ana Luisa Stuart was seen and treated in our department on 1/7/2021.  He may return to work on 1/10/20 with no restr